# Patient Record
Sex: FEMALE | Race: WHITE | Employment: UNEMPLOYED | URBAN - METROPOLITAN AREA
[De-identification: names, ages, dates, MRNs, and addresses within clinical notes are randomized per-mention and may not be internally consistent; named-entity substitution may affect disease eponyms.]

---

## 2022-08-25 ENCOUNTER — INITIAL CONSULT (OUTPATIENT)
Dept: CARDIOLOGY CLINIC | Age: 37
End: 2022-08-25
Payer: COMMERCIAL

## 2022-08-25 VITALS
HEIGHT: 62 IN | HEART RATE: 64 BPM | BODY MASS INDEX: 43.98 KG/M2 | DIASTOLIC BLOOD PRESSURE: 90 MMHG | SYSTOLIC BLOOD PRESSURE: 120 MMHG | WEIGHT: 239 LBS

## 2022-08-25 DIAGNOSIS — E78.5 DYSLIPIDEMIA: Primary | ICD-10-CM

## 2022-08-25 DIAGNOSIS — Z82.49 FAMILY HISTORY OF CORONARY ARTERY DISEASE: ICD-10-CM

## 2022-08-25 PROCEDURE — 93000 ELECTROCARDIOGRAM COMPLETE: CPT | Performed by: INTERNAL MEDICINE

## 2022-08-25 PROCEDURE — 99204 OFFICE O/P NEW MOD 45 MIN: CPT | Performed by: INTERNAL MEDICINE

## 2022-08-25 RX ORDER — CHLORAL HYDRATE 500 MG
1000 CAPSULE ORAL DAILY
COMMUNITY

## 2022-08-25 RX ORDER — EZETIMIBE 10 MG/1
10 TABLET ORAL DAILY
COMMUNITY

## 2022-08-25 RX ORDER — MAGNESIUM 200 MG
200 TABLET ORAL DAILY
COMMUNITY

## 2022-08-25 RX ORDER — NORETHINDRONE ACETATE AND ETHINYL ESTRADIOL AND FERROUS FUMARATE 1MG-20(24)
1 KIT ORAL DAILY
COMMUNITY
Start: 2022-01-10 | End: 2023-01-10

## 2022-08-25 RX ORDER — AMPICILLIN TRIHYDRATE 250 MG
CAPSULE ORAL
COMMUNITY
Start: 2022-07-26

## 2022-08-25 ASSESSMENT — ENCOUNTER SYMPTOMS
SLEEP DISTURBANCES DUE TO BREATHING: 0
SHORTNESS OF BREATH: 0
SNORING: 1

## 2022-08-25 NOTE — PROGRESS NOTES
[facility] CARDIOLOGY  [address], [address]  PHONE: [phone]      22    NAME:  Tyron Arredondo  : 1985  MRN: [id number]         SUBJECTIVE:   Tyron Arredondo is a 39 y.o. female seen for a consultation visit regarding the following:     Chief Complaint   Patient presents with    Consultation              HPI:  Consultation is requested by GOPI Mckeon for evaluation of Consultation   . Consult for reportedly FH  and family history CAD. She started with  but dropped 100 points on crestor monotherapy but felt she couldn't tolerate d/t muscle aches, switched to atorvastatin, felt this gave her insomnia. Now just on Zetia. Off statins for only a week. She also changed her diet after those initial labs, was exercising regularly but felt unable to on statins, plans to get back to the gym. Has lost 70 lbs Occasional \"flutters\" but no CP or dyspnea. Not diabetic, never smoker, no hypertension          Past Medical History, Past Surgical History, Family history, Social History, and Medications were all reviewed with the patient today and updated as necessary. Prior to Admission medications    Medication Sig Start Date End Date Taking?  Authorizing Provider   ezetimibe (ZETIA) 10 MG tablet Take 10 mg by mouth daily   Yes Historical Provider, MD   fexofenadine (ALLEGRA) 30 MG/5ML suspension Take by mouth as needed   Yes Historical Provider, MD   Multiple Vitamins-Minerals (MULTI ADULT GUMMIES PO) Take by mouth   Yes Historical Provider, MD   vitamin D (CHOLECALCIFEROL) 93813 UNIT CAPS Take 5,000 Units by mouth daily   Yes Historical Provider, MD   Coenzyme Q10 (COQ10) 200 MG CAPS TAKE 1 CAPSULE BY MOUTH ONCE DAILY 22  Yes Historical Provider, MD   magnesium 200 MG TABS tablet Take 200 mg by mouth daily   Yes Historical Provider, MD   Norethin Ace-Eth Estrad-FE 1-20 MG-MCG(24) TABS Take 1 tablet by mouth daily 1/10/22 1/10/23 Yes Historical Provider, MD   Omega-3 Fatty Acids (FISH OIL) 1000 MG CAPS Take 1,000 mg by mouth daily   Yes Historical Provider, MD     Allergies   Allergen Reactions    Crestor [Rosuvastatin] Myalgia    Lipitor [Atorvastatin] Other (See Comments)     Insomnia       Past Medical History:   Diagnosis Date    Hyperlipidemia     Mood swings     worse perimenstrually     Past Surgical History:   Procedure Laterality Date    DILATION AND CURETTAGE OF UTERUS       Family History   Problem Relation Age of Onset    High Cholesterol Mother     High Cholesterol Father     Heart Attack Father 45    Stroke Father     Heart Failure Father     Other Father         was a smoker    Diabetes Father         severe     Social History     Tobacco Use    Smoking status: Never    Smokeless tobacco: Never   Substance Use Topics    Alcohol use: Not on file       ROS:    Review of Systems   Constitutional: Positive for weight loss (intentional 70 lb, gained a little back when felt badly). Cardiovascular:  Negative for chest pain. Respiratory:  Positive for snoring. Negative for shortness of breath and sleep disturbances due to breathing. Neurological:  Positive for headaches. Negative for excessive daytime sleepiness. PHYSICAL EXAM:   BP (!) 120/90   Pulse 64   Ht 5' 2\" (1.575 m)   Wt 239 lb (108.4 kg)   BMI 43.71 kg/m²      Physical Exam  Constitutional:       Appearance: Normal appearance. HENT:      Head: Normocephalic and atraumatic. Eyes:      Conjunctiva/sclera: Conjunctivae normal.   Neck:      Vascular: No carotid bruit. Cardiovascular:      Rate and Rhythm: Normal rate and regular rhythm. Heart sounds: No murmur heard. No friction rub. No gallop. Pulmonary:      Effort: No respiratory distress. Breath sounds: No wheezing or rales. Musculoskeletal:         General: No swelling. Cervical back: Neck supple. Skin:     General: Skin is warm and dry.    Neurological:      General: No focal deficit present. Mental Status: She is alert. Psychiatric:         Mood and Affect: Mood normal.         Behavior: Behavior normal.       Medical problems and test results were reviewed with the patient today. DATA REVIEW     7/18/22 0734    Cholesterol, Total <200 mg/dL 155    Total HDL-C Direct > OR = 50 mg/dL 49 Low     Triglycerides <150 mg/dL 120    LDL Cholesterol mg/dL (calc) 84      4/18/22 0952     Cholesterol, Total <200 mg/dL 268 High     Total HDL-C Direct > OR = 50 mg/dL 46 Low     Triglycerides <150 mg/dL 133    LDL Cholesterol mg/dL (calc) 194 High       12/14/21 1550     Hemoglobin A1C <5.7 % of total Hgb 5.0      12/14/21 1550     Auto WBC 3.8 - 10.8 Thousand/uL 9.0    RBC 3.80 - 5.10 Million/uL 5.00    Hemoglobin 11.7 - 15.5 g/dL 15.1    Hematocrit 35.0 - 45.0 % 46.0 High     MCV 80.0 - 100.0 fL 92.0    MCH 27.0 - 33.0 pg 30.2    MCHC 32.0 - 36.0 g/dL 32.8    RDW 11.0 - 15.0 % 12.4    Platelets 191 - 078 Thousand/uL 267      7/18/22 0734     Glucose 65 - 99 mg/dL 89    Comment:               Fasting reference interval   BUN 7 - 25 mg/dL 15    Creatinine 0.50 - 0.97 mg/dL 0.81    eGFR > OR = 60 mL/min/1.73m2 96    Comment: The eGFR is based on the CKD-EPI 2021 equation. To calculate   the new eGFR from a previous Creatinine or Cystatin C   result, go to [email or web address]   [email or web address]   BUN/Creatinine Ratio 6 - 22 (calc) NOT APPLICABLE    Sodium 039 - 146 mmol/L 139    Potassium 3.5 - 5.3 mmol/L 4.3    Chloride 98 - 110 mmol/L 105    CO2 20 - 32 mmol/L 25    Calcium 8.6 - 10.2 mg/dL 8.7    Total Protein 6.1 - 8.1 g/dL 6.7    Albumin 3.6 - 5.1 g/dL 3.8    Globulin, Total 1.9 - 3.7 g/dL (calc) 2.9    A/G Ratio 1.0 - 2.5 (calc) 1.3    Total Bilirubin 0.2 - 1.2 mg/dL 0.4    Alkaline Phosphatase 31 - 125 U/L 26 Low     AST 10 - 30 U/L 17    ALT (SGPT) 6 - 29 U/L 18        EKG    Sinus  Rhythm 64  normal axis, intervals, ST and T waves        CXR/IMAGING        DEVICE INTERROGATION        OUTSIDE RECORDS REVIEW    Records from outside providers have been reviewed and summarized as noted in the HPI, past history and data review sections of this note, and reviewed with patient. .       ASSESSMENT and García Beltran was seen today for consultation. Diagnoses and all orders for this visit:    Dyslipidemia  -     EKG 12 Lead  -     Lipid Panel; Future    Family history of coronary artery disease  -     EKG 12 Lead  -     CT CARDIAC CALCIUM SCORING; Future        IMPRESSION:    Possibly familial dyslipidemia but dramatic improvement with statin monotherapy, sadly feels unable to tolerate this. We spent time discussing pros/cons/outcomes data for variety of cholesterol regimens and what is required to be able to prescribe pcsk9 inhibitors, including a 3rd statin trial.  Reviewed exhaustive data on statins with minimal SE's and may be worth one more try depending on results with zetia monotherapy. Check labs in 3 months    Her father had early MI but very different lifestyle and medical history with brittle diabetes, tobacco use. Reviewed data regarding risk mitigation strategies mostly based on lifestyle and much of which she's undertaken already. Normally would reserve coronary calcium scoring for a bit later age but with dad's history its not unreasonable. Explained anatomic versus functional coronary disease and coronary calcium as a prognostic marker only. Based on currently available data, CAC >100 warrants discussion of aspirin therapy and intensive BP goals, while CAC >300 and especially >1000 warrants intensifying lipid lowering therapy to secondary prevention levels (LDL<70), and other relevant preventive medications -- [id number] if diabetes present, icosapent ethyl.  in patients with an initial (first) CAC scan >100, it may be reasonable to initiate not only statins, but also other relevant preventive medications early on -- without the need for repeat CAC scanning later in time. In patients with an initial CAC <100 who only required statin therapy, a subsequent CAC score while on statin therapy may guide stepwise introduction of additional preventive medications. This paradigm maximizes early atherosclerotic cardiovascular disease (ASCVD) protection and prevents therapeutic inertia. Scores should not be repeated more than twice and generally 5-10 years apart in order to minimize unnecessary radiation exposure. Patient is encouraged to engage in moderate physical activity for at least 30 minutes on at least 6 days of the week, and to follow a diet rich in whole grains, fruits and vegetables, proven to reduce the incidence of events related to cardiovascular disease. For more detailed information, patient is referred to www.cardiosmart.org.      Return in about 3 months (around 11/25/2022) for RESULTS VIA MY CHART. Thank you for allowing me to participate in this patient's care. Please call or contact me if there are any questions or concerns regarding the above.       Ines Charles MD  08/25/22  8:58 AM

## 2022-08-25 NOTE — PATIENT INSTRUCTIONS
Patient Education        Learning About the Mediterranean Diet  What is the [address]? The Mediterranean diet is a style of eating rather than a diet plan. It features foods eaten in [place], [place], [place] and [place], and other countries along the [facility]. It emphasizes eating foods like fish, fruits, vegetables, beans, high-fiber breads and whole grains, nuts, and oliveoil. This style of eating includes limited red meat, cheese, and sweets. Why choose the Mediterranean diet? A Mediterranean-style diet may improve heart health. It contains more fat than other heart-healthy diets. But the fats are mainly from nuts, unsaturated oils (such as fish oils and olive oil), and certain nut or seed oils (such as canola, soybean, or flaxseed oil). These fats may help protect the heart andblood vessels. How can you get started on the Mediterranean diet? Here are some things you can do to switch to a more Mediterranean way of eating. What to eat  Eat a variety of fruits and vegetables each day, such as grapes, blueberries, tomatoes, broccoli, peppers, figs, olives, spinach, eggplant, beans, lentils, and chickpeas. Eat a variety of whole-grain foods each day, such as oats, brown rice, and whole wheat bread, pasta, and couscous. Eat fish at least 2 times a week. Try tuna, salmon, mackerel, lake trout, herring, or sardines. Eat moderate amounts of low-fat dairy products, such as milk, cheese, or yogurt. Eat moderate amounts of poultry and eggs. Choose healthy (unsaturated) fats, such as nuts, olive oil, and certain nut or seed oils like canola, soybean, and flaxseed. Limit unhealthy (saturated) fats, such as butter, palm oil, and coconut oil. And limit fats found in animal products, such as meat and dairy products made with whole milk. Try to eat red meat only a few times a month in very small amounts. Limit sweets and desserts to only a few times a week.  This includes sugar-sweetened drinks like soda. The Mediterranean diet may also include red wine with your meal--1 glass eachday for women and up to 2 glasses a day for men. Tips for eating at home  Use herbs, spices, garlic, lemon zest, and citrus juice instead of salt to add flavor to foods. Add avocado slices to your sandwich instead of regalado. Have fish for lunch or dinner instead of red meat. Brush the fish with olive oil, and broil or grill it. Sprinkle your salad with seeds or nuts instead of cheese. Cook with olive or canola oil instead of butter or oils that are high in saturated fat. Switch from 2% milk or whole milk to 1% or fat-free milk. Dip raw vegetables in a vinaigrette dressing or hummus instead of dips made from mayonnaise or sour cream.  Have a piece of fruit for dessert instead of a piece of cake. Try baked apples, or have some dried fruit. Tips for eating out  Try broiled, grilled, baked, or poached fish instead of having it fried or breaded. Ask your  to have your meals prepared with olive oil instead of butter. Order dishes made with marinara sauce or sauces made from olive oil. Avoid sauces made from cream or mayonnaise. Choose whole-grain breads, whole wheat pasta and pizza crust, brown rice, beans, and lentils. Cut back on butter or margarine on bread. Instead, you can dip your bread in a small amount of olive oil. Ask for a side salad or grilled vegetables instead of french fries or chips. Where can you learn more? Go to https://[employer]lenneb.[employer]. org and sign in to your [employer] account. Enter [phone] in the [facility] box to learn more about \"Learning About the Mediterranean Diet. \"     If you do not have an account, please click on the \"Sign Up Now\" link. Current as of: September 8, 2021               Content Version: 13.3  © 5288-2593 Healthwise, Incorporated. Care instructions adapted under license by [facility] ([facility]).  If you have questions about a medical condition or this instruction, always ask your healthcare professional. [place] [address] any warranty or liability for your use of this information.

## 2022-09-02 ENCOUNTER — HOSPITAL ENCOUNTER (OUTPATIENT)
Dept: CT IMAGING | Age: 37
Discharge: HOME | End: 2022-09-05

## 2022-09-02 DIAGNOSIS — Z82.49 FAMILY HISTORY OF CORONARY ARTERY DISEASE: ICD-10-CM

## 2022-11-29 ENCOUNTER — OFFICE VISIT (OUTPATIENT)
Dept: CARDIOLOGY CLINIC | Age: 37
End: 2022-11-29

## 2022-11-29 VITALS
SYSTOLIC BLOOD PRESSURE: 110 MMHG | HEART RATE: 60 BPM | WEIGHT: 255 LBS | BODY MASS INDEX: 46.93 KG/M2 | DIASTOLIC BLOOD PRESSURE: 70 MMHG | HEIGHT: 62 IN

## 2022-11-29 DIAGNOSIS — E66.01 MORBID OBESITY (HCC): ICD-10-CM

## 2022-11-29 DIAGNOSIS — E78.5 DYSLIPIDEMIA: Primary | ICD-10-CM

## 2022-11-29 PROCEDURE — 99213 OFFICE O/P EST LOW 20 MIN: CPT | Performed by: INTERNAL MEDICINE

## 2022-11-29 RX ORDER — ATORVASTATIN CALCIUM 40 MG/1
40 TABLET, FILM COATED ORAL DAILY
COMMUNITY

## 2022-11-29 NOTE — PATIENT INSTRUCTIONS
Patient Education        Learning About the Mediterranean Diet  What is the [address] [facility]? The Mediterranean diet is a style of eating rather than a diet plan. It features foods eaten in [place], [place], [place] and [place], and other countries along the [facility]. It emphasizes eating foods like fish, fruits, vegetables, beans, high-fiber breads and whole grains, nuts, and olive oil. This style of eating includes limited red meat, cheese, and sweets. Why choose the Mediterranean diet? A Mediterranean-style diet may improve heart health. It contains more fat than other heart-healthy diets. But the fats are mainly from nuts, unsaturated oils (such as fish oils and olive oil), and certain nut or seed oils (such as canola, soybean, or flaxseed oil). These fats may help protect the heart and blood vessels. How can you get started on the Mediterranean diet? Here are some things you can do to switch to a more Mediterranean way of eating. What to eat  Eat a variety of fruits and vegetables each day, such as grapes, blueberries, tomatoes, broccoli, peppers, figs, olives, spinach, eggplant, beans, lentils, and chickpeas. Eat a variety of whole-grain foods each day, such as oats, brown rice, and whole wheat bread, pasta, and couscous. Eat fish at least 2 times a week. Try tuna, salmon, mackerel, lake trout, herring, or sardines. Eat moderate amounts of low-fat dairy products, such as milk, cheese, or yogurt. Eat moderate amounts of poultry and eggs. Choose healthy (unsaturated) fats, such as nuts, olive oil, and certain nut or seed oils like canola, soybean, and flaxseed. Limit unhealthy (saturated) fats, such as butter, palm oil, and coconut oil. And limit fats found in animal products, such as meat and dairy products made with whole milk. Try to eat red meat only a few times a month in very small amounts. Limit sweets and desserts to only a few times a week.  This includes sugar-sweetened drinks like soda. The Mediterranean diet may also include red wine with your meal--1 glass each day for women and up to 2 glasses a day for men. Tips for eating at home  Use herbs, spices, garlic, lemon zest, and citrus juice instead of salt to add flavor to foods. Add avocado slices to your sandwich instead of regalado. Have fish for lunch or dinner instead of red meat. Brush the fish with olive oil, and broil or grill it. Sprinkle your salad with seeds or nuts instead of cheese. Cook with olive or canola oil instead of butter or oils that are high in saturated fat. Switch from 2% milk or whole milk to 1% or fat-free milk. Dip raw vegetables in a vinaigrette dressing or hummus instead of dips made from mayonnaise or sour cream.  Have a piece of fruit for dessert instead of a piece of cake. Try baked apples, or have some dried fruit. Tips for eating out  Try broiled, grilled, baked, or poached fish instead of having it fried or breaded. Ask your  to have your meals prepared with olive oil instead of butter. Order dishes made with marinara sauce or sauces made from olive oil. Avoid sauces made from cream or mayonnaise. Choose whole-grain breads, whole wheat pasta and pizza crust, brown rice, beans, and lentils. Cut back on butter or margarine on bread. Instead, you can dip your bread in a small amount of olive oil. Ask for a side salad or grilled vegetables instead of french fries or chips. Where can you learn more? Go to https://[employer] Medicine[employer]enn[employer]. org and sign in to your [employer] account. Enter [phone] in the [facility] box to learn more about \"Learning About the Mediterranean Diet. \"     If you do not have an account, please click on the \"Sign Up Now\" link. Current as of: May 9, 2022               Content Version: 13.4  © 6926-0057 Healthwise, Incorporated. Care instructions adapted under license by [facility] ([facility]).  If you have questions about a medical condition or this instruction, always ask your healthcare professional. [address] any warranty or liability for your use of this information. Please visit www.cardiosmart. org for more information regarding cardiovascular disease prevention and treatment.

## 2022-11-29 NOTE — PROGRESS NOTES
[facility] CARDIOLOGY  [address], [address]  PHONE: [phone]      22    NAME:  Shayne Rosa  : 1985  MRN: [id number]         SUBJECTIVE:   Shayne Rosa is a 40 y.o. female seen for a follow up visit regarding the following:     Chief Complaint   Patient presents with    Hyperlipidemia            HPI:  Follow up  Hyperlipidemia   . Follow up elevated cholesterol, prior reported statin intolerance, had calcium score to guide recommendations. Gratefully, this was 0, she was maintained on Zetia monotherapy, active lifestyle and healthy diet. However, her cholesterol climbed further so she and her PCP decided to resume statin therapy with moderate intensity statin therapy. She has some morning stiffness but hasn't been as bad. She's been in the process of moving and anniversary reaction since her dad's death, which was last month, she is seeing counselor and considering SSRI with her PCP. Working to get back to her exercise regularly. PAST CARDIAC HISTORY:  Aug 2022      Coronary calcium = 0          Key CAD CHF Meds            atorvastatin (LIPITOR) 40 MG tablet (Taking)    Class: Historical Med    ezetimibe (ZETIA) 10 MG tablet (Taking)    Class: Historical Med              Past Medical History, Past Surgical History, Family history, Social History, and Medications were all reviewed with the patient today and updated as necessary. Prior to Admission medications    Medication Sig Start Date End Date Taking?  Authorizing Provider   atorvastatin (LIPITOR) 40 MG tablet Take 40 mg by mouth daily   Yes Historical Provider, MD   ezetimibe (ZETIA) 10 MG tablet Take 10 mg by mouth daily   Yes Historical Provider, MD   fexofenadine (ALLEGRA) 30 MG/5ML suspension Take by mouth as needed   Yes Historical Provider, MD   Multiple Vitamins-Minerals ([address] [place] [address]) Take by mouth   Yes Historical Provider, MD   vitamin D (CHOLECALCIFEROL) 34758 UNIT CAPS Take 5,000 Units by mouth daily   Yes Historical Provider, MD   Coenzyme Q10 (COQ10) 200 MG CAPS TAKE 1 CAPSULE BY MOUTH ONCE DAILY 7/26/22  Yes Historical Provider, MD   magnesium 200 MG TABS tablet Take 200 mg by mouth daily   Yes Historical Provider, MD   Norethin Ace-Eth Estrad-FE 1-20 MG-MCG(24) TABS Take 1 tablet by mouth daily 1/10/22 1/10/23 Yes Historical Provider, MD   Omega-3 Fatty Acids (FISH OIL) 1000 MG CAPS Take 1,000 mg by mouth daily   Yes Historical Provider, MD     Allergies   Allergen Reactions    Crestor [Rosuvastatin] Myalgia     Past Medical History:   Diagnosis Date    Hyperlipidemia     Mood swings     worse perimenstrually     Past Surgical History:   Procedure Laterality Date    DILATION AND CURETTAGE OF UTERUS       Family History   Problem Relation Age of Onset    High Cholesterol Mother     High Cholesterol Father     Heart Attack Father 45    Stroke Father     Heart Failure Father     Other Father         was a smoker    Diabetes Father         severe     Social History     Tobacco Use    Smoking status: Never    Smokeless tobacco: Never   Substance Use Topics    Alcohol use: Not on file       ROS:    Review of Systems   Cardiovascular:  Negative for chest pain. PHYSICAL EXAM:   /70   Pulse 60   Ht 5' 2\" (1.575 m)   Wt 255 lb (115.7 kg)   BMI 46.64 kg/m²      Wt Readings from Last 3 Encounters:   11/29/22 255 lb (115.7 kg)   08/25/22 239 lb (108.4 kg)     BP Readings from Last 3 Encounters:   11/29/22 110/70   08/25/22 (!) 120/90     Pulse Readings from Last 3 Encounters:   11/29/22 60   08/25/22 64           Physical Exam  Constitutional:       Appearance: Normal appearance. HENT:      Head: Normocephalic and atraumatic. Eyes:      Conjunctiva/sclera: Conjunctivae normal.   Neck:      Vascular: No carotid bruit. Cardiovascular:      Rate and Rhythm: Normal rate and regular rhythm. Heart sounds: No murmur heard. No friction rub. No gallop. Pulmonary:      Effort: No respiratory distress. Breath sounds: No wheezing or rales. Musculoskeletal:         General: No swelling. Cervical back: Neck supple. Skin:     General: Skin is warm and dry. Neurological:      General: No focal deficit present. Mental Status: She is alert. Psychiatric:         Mood and Affect: Mood normal.         Behavior: Behavior normal.       Medical problems and test results were reviewed with the patient today. DATA REVIEW          Cholesterol, Total   Total HDL-C Direct   Triglycerides   LDL Cholesterol   Chol/HDL Ratio   Non HDL Chol. (LDL+VLDL)   Cholesterol   LDL Calculated     Component 10/18/2022 07/18/2022 04/18/2022         Cholesterol, Total -- 155 268 High       Total HDL-C Direct 50 49 Low     46 Low       Triglycerides 150 High     120 133   LDL Cholesterol -- 84 194 High        Chol/HDL Ratio 5.0 High     3.2 5.8 High       Non HDL Chol. (LDL+VLDL) 199 High      106 222 High        Cholesterol 249 High     -- --   LDL Calculated          EKG        CXR/IMAGING        DEVICE INTERROGATION        OUTSIDE RECORDS REVIEW    Records from outside providers have been reviewed and summarized as noted in the HPI, past history and data review sections of this note, and reviewed with patient. .       ASSESSMENT and [address] was seen today for hyperlipidemia. Diagnoses and all orders for this visit:    Dyslipidemia    Morbid obesity ([address]        IMPRESSION:    Tolerating moderate dose atorvastatin and Zetia. Overall low risk, coronary calcium 0, father was a brittle diabetic with very different lifestyle, encouraged ongoing risk modification. Return if symptoms worsen or fail to improve. Thank you for allowing me to participate in this patient's care. Please call or contact me if there are any questions or concerns regarding the above.       Tejal Conroy MD  11/29/22  8:09 AM